# Patient Record
Sex: MALE | URBAN - METROPOLITAN AREA
[De-identification: names, ages, dates, MRNs, and addresses within clinical notes are randomized per-mention and may not be internally consistent; named-entity substitution may affect disease eponyms.]

---

## 2020-12-18 ENCOUNTER — OFFICE VISIT (OUTPATIENT)
Dept: URBAN - METROPOLITAN AREA CLINIC 62 | Facility: CLINIC | Age: 50
End: 2020-12-18
Payer: COMMERCIAL

## 2020-12-18 DIAGNOSIS — H49.11 FOURTH [TROCHLEAR] NERVE PALSY, RIGHT EYE: Primary | Chronic | ICD-10-CM

## 2020-12-18 PROCEDURE — 92004 COMPRE OPH EXAM NEW PT 1/>: CPT | Performed by: OPTOMETRIST

## 2020-12-18 ASSESSMENT — INTRAOCULAR PRESSURE
OD: 12
OS: 12

## 2020-12-18 ASSESSMENT — VISUAL ACUITY
OS: 20/20
OD: 20/20

## 2020-12-18 NOTE — IMPRESSION/PLAN
Impression: Fourth [trochlear] nerve palsy, right eye: H49.11. Plan: Discussed diagnosis with patient in detail. Suspect microvascular etiology, hypertensive with recent alcohol abuse. PERRL. Diplopia improves with right head tilt. Recommend MRI of brain and orbits if not resolving in 1 month. Will continue to observe condition and/or symptoms. Patient instructed to call if condition gets worse.